# Patient Record
Sex: FEMALE | Race: WHITE | Employment: FULL TIME | URBAN - METROPOLITAN AREA
[De-identification: names, ages, dates, MRNs, and addresses within clinical notes are randomized per-mention and may not be internally consistent; named-entity substitution may affect disease eponyms.]

---

## 2019-08-04 ENCOUNTER — HOSPITAL ENCOUNTER (EMERGENCY)
Age: 64
Discharge: HOME | End: 2019-08-04
Attending: EMERGENCY MEDICINE
Payer: COMMERCIAL

## 2019-08-04 ENCOUNTER — APPOINTMENT (OUTPATIENT)
Dept: GENERAL RADIOLOGY | Age: 64
End: 2019-08-04
Payer: COMMERCIAL

## 2019-08-04 VITALS
DIASTOLIC BLOOD PRESSURE: 79 MMHG | OXYGEN SATURATION: 95 % | HEART RATE: 91 BPM | TEMPERATURE: 97.8 F | BODY MASS INDEX: 25.71 KG/M2 | SYSTOLIC BLOOD PRESSURE: 133 MMHG | RESPIRATION RATE: 16 BRPM | WEIGHT: 160 LBS | HEIGHT: 66 IN

## 2019-08-04 DIAGNOSIS — M25.532 LEFT WRIST PAIN: Primary | ICD-10-CM

## 2019-08-04 PROCEDURE — 6370000000 HC RX 637 (ALT 250 FOR IP): Performed by: EMERGENCY MEDICINE

## 2019-08-04 PROCEDURE — 99283 EMERGENCY DEPT VISIT LOW MDM: CPT

## 2019-08-04 PROCEDURE — 73110 X-RAY EXAM OF WRIST: CPT

## 2019-08-04 RX ORDER — IBUPROFEN 800 MG/1
800 TABLET ORAL ONCE
Status: COMPLETED | OUTPATIENT
Start: 2019-08-04 | End: 2019-08-04

## 2019-08-04 RX ORDER — MELOXICAM 15 MG/1
15 TABLET ORAL DAILY
Qty: 30 TABLET | Refills: 0 | Status: SHIPPED | OUTPATIENT
Start: 2019-08-04

## 2019-08-04 RX ADMIN — IBUPROFEN 800 MG: 800 TABLET ORAL at 09:02

## 2019-08-04 ASSESSMENT — ENCOUNTER SYMPTOMS
VOMITING: 0
BACK PAIN: 0
EYE DISCHARGE: 0
NAUSEA: 0
WHEEZING: 0
FACIAL SWELLING: 0
BLOOD IN STOOL: 0
RHINORRHEA: 0
SORE THROAT: 0
COLOR CHANGE: 0
SHORTNESS OF BREATH: 0
COUGH: 0
SINUS PRESSURE: 0
TROUBLE SWALLOWING: 0
EYE PAIN: 0
CONSTIPATION: 0
ABDOMINAL PAIN: 0
CHEST TIGHTNESS: 0
EYE REDNESS: 0
DIARRHEA: 0

## 2019-08-04 ASSESSMENT — PAIN DESCRIPTION - LOCATION: LOCATION: WRIST

## 2019-08-04 ASSESSMENT — PAIN SCALES - GENERAL
PAINLEVEL_OUTOF10: 10
PAINLEVEL_OUTOF10: 10

## 2019-08-04 ASSESSMENT — PAIN DESCRIPTION - PAIN TYPE: TYPE: ACUTE PAIN

## 2019-08-04 ASSESSMENT — PAIN DESCRIPTION - ORIENTATION: ORIENTATION: LEFT

## 2019-08-04 NOTE — ED PROVIDER NOTES
[address]  eMERGENCY dEPARTMENT eNCOUnter      Pt Name: Kaylyn Oppenheim  MRN: [id number]  [place] 1955  Date of evaluation: 8/4/19      CHIEF COMPLAINT       Chief Complaint   Patient presents with    Wrist Pain         HISTORY OF PRESENT ILLNESS    Kaylyn Oppenheim is a 60 y.o. female who presents complaining of wrist pain    Location/Symptom: Left wrist pain  Timing/Onset: About a week   Context/Setting: patient does not recall any injuries. She is a  but right-handed  Quality: Throbbing and sharp  Duration: Constant  Modifying Factors: Movement  Severity: Severe      REVIEW OF SYSTEMS       Review of Systems   Constitutional: Negative for activity change, appetite change, chills, diaphoresis and fever. HENT: Negative for congestion, ear pain, facial swelling, nosebleeds, rhinorrhea, sinus pressure, sore throat and trouble swallowing. Eyes: Negative for pain, discharge and redness. Respiratory: Negative for cough, chest tightness, shortness of breath and wheezing. Cardiovascular: Negative for chest pain, palpitations and leg swelling. Gastrointestinal: Negative for abdominal pain, blood in stool, constipation, diarrhea, nausea and vomiting. Genitourinary: Negative for difficulty urinating, dysuria, flank pain, frequency, genital sores and hematuria. Musculoskeletal: Negative for arthralgias, back pain, gait problem, joint swelling, myalgias and neck pain. Pain without injury   Skin: Negative for color change, pallor, rash and wound. Neurological: Negative for dizziness, tremors, seizures, syncope, speech difficulty, weakness, numbness and headaches. Psychiatric/Behavioral: Negative for confusion, decreased concentration, hallucinations, self-injury, sleep disturbance and suicidal ideas. PAST MEDICAL HISTORY   History reviewed. No pertinent past medical history. SURGICAL HISTORY     History reviewed. No pertinent surgical history.     CURRENT

## 2019-08-04 NOTE — ED NOTES
Pt discharged in stable condition with Rx's and discharge instructions. Pt ambulates to door with steady gait and without assistance.       Misha Serrano RN  08/04/19 [id number]

## 2022-11-14 ENCOUNTER — HOSPITAL ENCOUNTER (OUTPATIENT)
Dept: NUCLEAR MEDICINE | Age: 67
Discharge: HOME | End: 2022-11-16
Payer: MEDICARE

## 2022-11-14 ENCOUNTER — HOSPITAL ENCOUNTER (OUTPATIENT)
Dept: NON INVASIVE DIAGNOSTICS | Age: 67
Discharge: HOME | End: 2022-11-14
Payer: MEDICARE

## 2022-11-14 VITALS — HEIGHT: 67 IN | BODY MASS INDEX: 26.68 KG/M2 | WEIGHT: 170 LBS

## 2022-11-14 DIAGNOSIS — R06.02 SOB (SHORTNESS OF BREATH): ICD-10-CM

## 2022-11-14 DIAGNOSIS — R06.02 SHORTNESS OF BREATH: ICD-10-CM

## 2022-11-14 DIAGNOSIS — R07.9 CHEST PAIN, UNSPECIFIED TYPE: ICD-10-CM

## 2022-11-14 LAB
LV EF: 55 %
LV EF: 67 %
LVEF MODALITY: NORMAL
LVEF MODALITY: NORMAL

## 2022-11-14 PROCEDURE — 3430000000 HC RX DIAGNOSTIC RADIOPHARMACEUTICAL: Performed by: FAMILY MEDICINE

## 2022-11-14 PROCEDURE — A9500 TC99M SESTAMIBI: HCPCS | Performed by: FAMILY MEDICINE

## 2022-11-14 PROCEDURE — 93306 TTE W/DOPPLER COMPLETE: CPT

## 2022-11-14 PROCEDURE — 93017 CV STRESS TEST TRACING ONLY: CPT

## 2022-11-14 PROCEDURE — 2580000003 HC RX 258: Performed by: FAMILY MEDICINE

## 2022-11-14 PROCEDURE — 78452 HT MUSCLE IMAGE SPECT MULT: CPT

## 2022-11-14 RX ORDER — ATROPINE SULFATE 0.1 MG/ML
0.5 INJECTION INTRAVENOUS EVERY 5 MIN PRN
Status: DISCONTINUED | OUTPATIENT
Start: 2022-11-14 | End: 2022-11-14 | Stop reason: HOSPADM

## 2022-11-14 RX ORDER — SODIUM CHLORIDE 9 MG/ML
500 INJECTION, SOLUTION INTRAVENOUS CONTINUOUS PRN
Status: DISCONTINUED | OUTPATIENT
Start: 2022-11-14 | End: 2022-11-14 | Stop reason: HOSPADM

## 2022-11-14 RX ORDER — NITROGLYCERIN 0.4 MG/1
0.4 TABLET SUBLINGUAL EVERY 5 MIN PRN
Status: DISCONTINUED | OUTPATIENT
Start: 2022-11-14 | End: 2022-11-14 | Stop reason: HOSPADM

## 2022-11-14 RX ORDER — SODIUM CHLORIDE 0.9 % (FLUSH) 0.9 %
10 SYRINGE (ML) INJECTION PRN
Status: DISCONTINUED | OUTPATIENT
Start: 2022-11-14 | End: 2022-11-17 | Stop reason: HOSPADM

## 2022-11-14 RX ORDER — ALBUTEROL SULFATE 90 UG/1
2 AEROSOL, METERED RESPIRATORY (INHALATION) PRN
Status: DISCONTINUED | OUTPATIENT
Start: 2022-11-14 | End: 2022-11-14 | Stop reason: HOSPADM

## 2022-11-14 RX ORDER — SODIUM CHLORIDE 0.9 % (FLUSH) 0.9 %
5-40 SYRINGE (ML) INJECTION PRN
Status: DISCONTINUED | OUTPATIENT
Start: 2022-11-14 | End: 2022-11-14 | Stop reason: HOSPADM

## 2022-11-14 RX ORDER — METOPROLOL TARTRATE 5 MG/5ML
5 INJECTION INTRAVENOUS EVERY 5 MIN PRN
Status: DISCONTINUED | OUTPATIENT
Start: 2022-11-14 | End: 2022-11-14 | Stop reason: HOSPADM

## 2022-11-14 RX ADMIN — SODIUM CHLORIDE, PRESERVATIVE FREE 10 ML: 5 INJECTION INTRAVENOUS at 09:32

## 2022-11-14 RX ADMIN — TETRAKIS(2-METHOXYISOBUTYLISOCYANIDE)COPPER(I) TETRAFLUOROBORATE 32.7 MILLICURIE: 1 INJECTION, POWDER, LYOPHILIZED, FOR SOLUTION INTRAVENOUS at 10:11

## 2022-11-14 RX ADMIN — SODIUM CHLORIDE, PRESERVATIVE FREE 10 ML: 5 INJECTION INTRAVENOUS at 08:20

## 2022-11-14 RX ADMIN — TETRAKIS(2-METHOXYISOBUTYLISOCYANIDE)COPPER(I) TETRAFLUOROBORATE 10.7 MILLICURIE: 1 INJECTION, POWDER, LYOPHILIZED, FOR SOLUTION INTRAVENOUS at 08:20

## 2022-11-14 NOTE — PROGRESS NOTES
CST Exercise. Stress Tech performs patient preparation of physical comfort, review test procedures, pre-stress EKG. Consent verified. Educated patient on test procedure and possible side effects.  Cardiologist reviewed pre-test EKG and is present for test. Patient tolerated test well with minor SOB which resolved to baseline after test.

## 2022-11-16 NOTE — PROCEDURES
[address] [place] [address]                              CARDIAC STRESS TEST    PATIENT NAME: Elizabeth Shaw                 :        1955  MED REC NO:   [id number]                              ROOM:  ACCOUNT NO:   [de-identified]                           ADMIT DATE: 2022  PROVIDER:     Ellyn Yu    DATE OF STUDY:  2022    ORDERING PROVIDER:  Niecy Ha DO    PRIMARY CARE PROVIDER:  Niecy Ha DO    INTERPRETING PHYSICIAN:  Farhana Albert MD    _____ STRESS TESTING    TEST TYPE: CARDIOLYTE TREADMILL STRESS TEST  INDICATION: SHORTNESS OF BREATH  REFERRING PHYSICIAN: Niecy Ha DO    100% MAX PREDICTED HEART RATE: 155 BEATS PER MINUTE  85% MAX PREDICTED HEART RATE: 131 BEATS PER MINUTE  RESTING HEART RATE: 79 BEATS PER MINUTE  MAXIMUM HEART RATE ACHIEVED: 148 BEATS PER MINUTE  PERCENTAGE OF AGE PREDICTED MAXIMUM ACHIEVED: 95 %  RESTING BLOOD PRESSURE: 121/70  PEAK BLOOD PRESSURE: 202/76  PEAK DOUBLE PRODUCT: 29,896  METS: 7.00    MEDICATION(S) GIVEN:  REASON FOR TERMINATION: 85% OF MAXIMUM PREDICTED HEART RATE ACHIEVED  TOTAL TIME ON TREADMILL: 6 MINUTES    RESTING EKG: NORMAL  STRESS HEART RESPONSE: NORMAL RESPONSE  BLOOD PRESSURE RESPONSE: APPROPRIATE  STRESS EKGs: NO CHANGES SEEN  CHEST DISCOMFORT:  ISCHEMIC EKG CHANGES: NONE    EKG IMPRESSION: ELECTROCARDIOGRAPHICALLY NEGATIVE TREADMILL STRESS TEST. RADIOISOTOPE RESULTS TO FOLLOW FROM DEPARTMENT OF NUCLEAR MEDICINE.     Micheal Higgins [facility]    D: 2022 8:17:58       T: 2022 8:19:57     ZEB/JESSICA  Job#: [id number]     Doc#: Unknown    CC:    (Retain this field even if not dictated or not decipherable)

## 2024-06-25 ENCOUNTER — TELEPHONE (OUTPATIENT)
Age: 69
End: 2024-06-25

## 2024-06-25 RX ORDER — ONDANSETRON 4 MG/1
4 TABLET, FILM COATED ORAL EVERY 6 HOURS PRN
Qty: 10 TABLET | Refills: 0 | Status: SHIPPED | OUTPATIENT
Start: 2024-06-25

## 2024-06-25 RX ORDER — POLYETHYLENE GLYCOL 3350 17 G/17G
POWDER, FOR SOLUTION ORAL
Qty: 238 G | Refills: 0 | Status: SHIPPED | OUTPATIENT
Start: 2024-06-25

## 2024-06-25 RX ORDER — BISACODYL 5 MG/1
TABLET, DELAYED RELEASE ORAL
Qty: 4 TABLET | Refills: 0 | Status: SHIPPED | OUTPATIENT
Start: 2024-06-25

## 2024-06-25 NOTE — TELEPHONE ENCOUNTER
Our surgery schedulers will contact you to schedule,If you do not hear from our office in 1 week please call us at [phone]          [facility]  Tashi Herrera MD, FACS  Shi Gonsalez, JESSICA-CNP  [address], [address]  [place] [id number]  Phone: [phone]  Fax: [phone]      Miralax (Polyethylene Glycol)  STOP Aspirin 7 Days prior to Procedure  STOP all other Blood Thinners 5 Days prior to Procedure      **DO NOT EAT ANY SOLID FOOD THE DAY BEFORE YOUR PROCEDURE**  **YOU MUST BE ON A CLEAR LIQUID DIET ONLY**    Approved Clear Liquids:  Any flavor of soda except Red or Purple  Fruit Juice without pulp  Coffee or tea without dairy products  Jell-O without fruit or toppings, No Red or Purple  Pop-shanta or Italian Ice, No Red or Purple  Chicken or Beef broth and bouillon      DRINK PLENTY OF WATER THROUGHOUT THE DAY    At 10:00 am the day before your procedure, take all 4 Dulcolax Tablets.  At 6:00 pm the day before your procedure, mix the ENTIRE bottle of Miralax (238 gram or Close to it) with 64 ounces of Gatorade or Propel Water (NOT RED or PURPLE).  You must consume the entire 64 ounces by 8:00 pm.  Continue clear liquid diet up until midnight.    NOTHING TO EAT, DRINK, SMOKE, OR CHEW AFTER MIDNIGHT    You may brush your teeth, rinse, gargle, and spit.  Heart or Blood pressure medications ONLY with a small sip of water, unless otherwise directed.  Shower with regular soap and water.    YOU MUST HAVE AN ADULT EITHER DRIVE YOU OR RIDE IN A CAB WITH YOU        MY PROCEDURE IS SCHEDULED FOR:    Date of Procedure: 2024   Time: 12:00 PM  Arrival Time:10:00 AM  Location:  [facility]   Your scripts have been sent to: [facility]   Pre-Admission Testin2024 at 11:00 AM  Follow Up Appointment at [facility]: 2024 at 10:15 AM    SURGICAL REQUIREMENTS :    - If you have any coverage / billing questions

## 2024-07-09 ENCOUNTER — TELEPHONE (OUTPATIENT)
Age: 69
End: 2024-07-09

## 2024-07-09 NOTE — TELEPHONE ENCOUNTER
Patient called stating she does not want to have her scheduled colonoscopy due to deciding to do a cologuard. Called over to surgery and spoke to jd and cancelled surgery.